# Patient Record
Sex: FEMALE | NOT HISPANIC OR LATINO
[De-identification: names, ages, dates, MRNs, and addresses within clinical notes are randomized per-mention and may not be internally consistent; named-entity substitution may affect disease eponyms.]

---

## 2017-09-27 ENCOUNTER — CHARTING TRANS (OUTPATIENT)
Dept: OTHER | Age: 47
End: 2017-09-27

## 2017-09-27 ENCOUNTER — LAB SERVICES (OUTPATIENT)
Dept: OTHER | Age: 47
End: 2017-09-27

## 2017-09-27 LAB
FLU A AG RAPID SCREEN: NEGATIVE
FLU B AG RAPID SCREEN: NEGATIVE
FLU RAPID SCREEN: NORMAL
SOURCE: NORMAL

## 2018-11-02 VITALS
HEIGHT: 67 IN | OXYGEN SATURATION: 96 % | BODY MASS INDEX: 29.82 KG/M2 | WEIGHT: 190 LBS | HEART RATE: 70 BPM | TEMPERATURE: 98.3 F | RESPIRATION RATE: 18 BRPM

## 2020-01-02 ENCOUNTER — WALK IN (OUTPATIENT)
Dept: URGENT CARE | Age: 50
End: 2020-01-02

## 2020-01-02 VITALS
HEART RATE: 60 BPM | WEIGHT: 205 LBS | TEMPERATURE: 98 F | DIASTOLIC BLOOD PRESSURE: 80 MMHG | SYSTOLIC BLOOD PRESSURE: 112 MMHG | OXYGEN SATURATION: 98 % | RESPIRATION RATE: 18 BRPM | BODY MASS INDEX: 32.18 KG/M2 | HEIGHT: 67 IN

## 2020-01-02 DIAGNOSIS — J01.00 ACUTE MAXILLARY SINUSITIS, RECURRENCE NOT SPECIFIED: Primary | ICD-10-CM

## 2020-01-02 LAB
INTERNAL PROCEDURAL CONTROLS ACCEPTABLE: YES
S PYO AG THROAT QL IA.RAPID: NEGATIVE

## 2020-01-02 PROCEDURE — 99214 OFFICE O/P EST MOD 30 MIN: CPT | Performed by: NURSE PRACTITIONER

## 2020-01-02 PROCEDURE — 87880 STREP A ASSAY W/OPTIC: CPT | Performed by: NURSE PRACTITIONER

## 2020-01-02 RX ORDER — FLUTICASONE PROPIONATE 50 MCG
1 SPRAY, SUSPENSION (ML) NASAL DAILY
Qty: 16 G | Refills: 0 | Status: SHIPPED | OUTPATIENT
Start: 2020-01-02

## 2020-01-02 RX ORDER — AMOXICILLIN AND CLAVULANATE POTASSIUM 875; 125 MG/1; MG/1
1 TABLET, FILM COATED ORAL 2 TIMES DAILY
Qty: 20 TABLET | Refills: 0 | Status: SHIPPED | OUTPATIENT
Start: 2020-01-02 | End: 2020-01-12

## 2020-01-02 RX ORDER — ESTRADIOL 0.1 MG/G
CREAM VAGINAL
Refills: 2 | COMMUNITY
Start: 2019-12-30

## 2020-01-02 SDOH — HEALTH STABILITY: MENTAL HEALTH: HOW OFTEN DO YOU HAVE A DRINK CONTAINING ALCOHOL?: 2-3 TIMES A WEEK

## 2020-01-02 ASSESSMENT — ENCOUNTER SYMPTOMS
STRIDOR: 0
FEVER: 0
COLOR CHANGE: 0
SHORTNESS OF BREATH: 0
EYE PAIN: 0
DIZZINESS: 0
EYE ITCHING: 0
HEADACHES: 0
SINUS PRESSURE: 1
CHILLS: 0
SINUS PAIN: 1
WHEEZING: 0
WEAKNESS: 0
TROUBLE SWALLOWING: 0
NAUSEA: 0
COUGH: 1
DIARRHEA: 0
FACIAL SWELLING: 0
SORE THROAT: 0
CHOKING: 0
RHINORRHEA: 1
VOMITING: 0
LIGHT-HEADEDNESS: 0
CHEST TIGHTNESS: 0
ABDOMINAL PAIN: 0
APNEA: 0
PHOTOPHOBIA: 0
APPETITE CHANGE: 0
EYE REDNESS: 0
EYE DISCHARGE: 0

## 2025-02-25 ENCOUNTER — APPOINTMENT (OUTPATIENT)
Dept: ULTRASOUND IMAGING | Age: 55
End: 2025-02-25
Attending: EMERGENCY MEDICINE
Payer: COMMERCIAL

## 2025-02-25 ENCOUNTER — HOSPITAL ENCOUNTER (EMERGENCY)
Age: 55
Discharge: HOME | End: 2025-02-25
Attending: EMERGENCY MEDICINE
Payer: COMMERCIAL

## 2025-02-25 ENCOUNTER — APPOINTMENT (OUTPATIENT)
Dept: CT IMAGING | Age: 55
End: 2025-02-25
Attending: EMERGENCY MEDICINE
Payer: COMMERCIAL

## 2025-02-25 VITALS
HEART RATE: 68 BPM | DIASTOLIC BLOOD PRESSURE: 78 MMHG | SYSTOLIC BLOOD PRESSURE: 135 MMHG | RESPIRATION RATE: 17 BRPM | BODY MASS INDEX: 32.96 KG/M2 | OXYGEN SATURATION: 100 % | TEMPERATURE: 98 F | WEIGHT: 210 LBS | HEIGHT: 67 IN

## 2025-02-25 DIAGNOSIS — R10.9 FLANK PAIN: Primary | ICD-10-CM

## 2025-02-25 DIAGNOSIS — N83.8 OVARIAN MASS: ICD-10-CM

## 2025-02-25 LAB
ALBUMIN SERPL-MCNC: 5.2 G/DL (ref 3.2–4.8)
ALBUMIN/GLOB SERPL: 2.5 {RATIO} (ref 1–2)
ALP LIVER SERPL-CCNC: 82 U/L
ALT SERPL-CCNC: 46 U/L
ANION GAP SERPL CALC-SCNC: 10 MMOL/L (ref 0–18)
AST SERPL-CCNC: 28 U/L (ref ?–34)
BASOPHILS # BLD AUTO: 0.14 X10(3) UL (ref 0–0.2)
BASOPHILS NFR BLD AUTO: 1.9 %
BILIRUB SERPL-MCNC: 0.5 MG/DL (ref 0.3–1.2)
BILIRUB UR QL STRIP.AUTO: NEGATIVE
BUN BLD-MCNC: 16 MG/DL (ref 9–23)
BUN BLD-MCNC: 18 MG/DL (ref 7–18)
CALCIUM BLD-MCNC: 10.1 MG/DL (ref 8.7–10.6)
CHLORIDE BLD-SCNC: 104 MMOL/L (ref 98–112)
CHLORIDE SERPL-SCNC: 101 MMOL/L (ref 98–112)
CLARITY UR REFRACT.AUTO: CLEAR
CO2 BLD-SCNC: 24 MMOL/L (ref 21–32)
CO2 SERPL-SCNC: 27 MMOL/L (ref 21–32)
COLOR UR AUTO: YELLOW
CREAT BLD-MCNC: 0.9 MG/DL
CREAT BLD-MCNC: 0.94 MG/DL
EGFRCR SERPLBLD CKD-EPI 2021: 72 ML/MIN/1.73M2 (ref 60–?)
EGFRCR SERPLBLD CKD-EPI 2021: 76 ML/MIN/1.73M2 (ref 60–?)
EOSINOPHIL # BLD AUTO: 0.37 X10(3) UL (ref 0–0.7)
EOSINOPHIL NFR BLD AUTO: 4.9 %
ERYTHROCYTE [DISTWIDTH] IN BLOOD BY AUTOMATED COUNT: 12.8 %
GLOBULIN PLAS-MCNC: 2.1 G/DL (ref 2–3.5)
GLUCOSE BLD-MCNC: 106 MG/DL (ref 70–99)
GLUCOSE BLD-MCNC: 106 MG/DL (ref 70–99)
GLUCOSE UR STRIP.AUTO-MCNC: NEGATIVE MG/DL
HCT VFR BLD AUTO: 42.8 %
HCT VFR BLD CALC: 44 %
HGB BLD-MCNC: 14.6 G/DL
IMM GRANULOCYTES # BLD AUTO: 0.02 X10(3) UL (ref 0–1)
IMM GRANULOCYTES NFR BLD: 0.3 %
ISTAT IONIZED CALCIUM FOR CHEM 8: 1.22 MMOL/L (ref 1.12–1.32)
KETONES UR STRIP.AUTO-MCNC: NEGATIVE MG/DL
LIPASE SERPL-CCNC: 31 U/L (ref 12–53)
LYMPHOCYTES # BLD AUTO: 2.7 X10(3) UL (ref 1–4)
LYMPHOCYTES NFR BLD AUTO: 35.7 %
MCH RBC QN AUTO: 31.2 PG (ref 26–34)
MCHC RBC AUTO-ENTMCNC: 34.1 G/DL (ref 31–37)
MCV RBC AUTO: 91.5 FL
MONOCYTES # BLD AUTO: 0.42 X10(3) UL (ref 0.1–1)
MONOCYTES NFR BLD AUTO: 5.6 %
NEUTROPHILS # BLD AUTO: 3.91 X10 (3) UL (ref 1.5–7.7)
NEUTROPHILS # BLD AUTO: 3.91 X10(3) UL (ref 1.5–7.7)
NEUTROPHILS NFR BLD AUTO: 51.6 %
NITRITE UR QL STRIP.AUTO: NEGATIVE
OSMOLALITY SERPL CALC.SUM OF ELEC: 288 MOSM/KG (ref 275–295)
PH UR STRIP.AUTO: 6 [PH] (ref 5–8)
PLATELET # BLD AUTO: 315 10(3)UL (ref 150–450)
POTASSIUM BLD-SCNC: 4 MMOL/L (ref 3.6–5.1)
POTASSIUM SERPL-SCNC: 4 MMOL/L (ref 3.5–5.1)
PROT SERPL-MCNC: 7.3 G/DL (ref 5.7–8.2)
PROT UR STRIP.AUTO-MCNC: NEGATIVE MG/DL
RBC # BLD AUTO: 4.68 X10(6)UL
RBC UR QL AUTO: NEGATIVE
SODIUM BLD-SCNC: 136 MMOL/L (ref 136–145)
SODIUM SERPL-SCNC: 138 MMOL/L (ref 136–145)
SP GR UR STRIP.AUTO: 1.01 (ref 1–1.03)
UROBILINOGEN UR STRIP.AUTO-MCNC: 0.2 MG/DL
WBC # BLD AUTO: 7.6 X10(3) UL (ref 4–11)

## 2025-02-25 PROCEDURE — 74177 CT ABD & PELVIS W/CONTRAST: CPT | Performed by: EMERGENCY MEDICINE

## 2025-02-25 PROCEDURE — 87086 URINE CULTURE/COLONY COUNT: CPT | Performed by: EMERGENCY MEDICINE

## 2025-02-25 PROCEDURE — 76856 US EXAM PELVIC COMPLETE: CPT | Performed by: EMERGENCY MEDICINE

## 2025-02-25 PROCEDURE — 85025 COMPLETE CBC W/AUTO DIFF WBC: CPT | Performed by: EMERGENCY MEDICINE

## 2025-02-25 PROCEDURE — 96374 THER/PROPH/DIAG INJ IV PUSH: CPT

## 2025-02-25 PROCEDURE — 93975 VASCULAR STUDY: CPT | Performed by: EMERGENCY MEDICINE

## 2025-02-25 PROCEDURE — 83690 ASSAY OF LIPASE: CPT | Performed by: EMERGENCY MEDICINE

## 2025-02-25 PROCEDURE — 80047 BASIC METABLC PNL IONIZED CA: CPT

## 2025-02-25 PROCEDURE — 76830 TRANSVAGINAL US NON-OB: CPT | Performed by: EMERGENCY MEDICINE

## 2025-02-25 PROCEDURE — 81001 URINALYSIS AUTO W/SCOPE: CPT | Performed by: EMERGENCY MEDICINE

## 2025-02-25 PROCEDURE — 80053 COMPREHEN METABOLIC PANEL: CPT | Performed by: EMERGENCY MEDICINE

## 2025-02-25 PROCEDURE — 99284 EMERGENCY DEPT VISIT MOD MDM: CPT

## 2025-02-25 PROCEDURE — 96361 HYDRATE IV INFUSION ADD-ON: CPT

## 2025-02-25 PROCEDURE — 99285 EMERGENCY DEPT VISIT HI MDM: CPT

## 2025-02-25 RX ORDER — DICLOFENAC SODIUM 75 MG/1
75 TABLET, DELAYED RELEASE ORAL 2 TIMES DAILY
Qty: 20 TABLET | Refills: 0 | Status: SHIPPED | OUTPATIENT
Start: 2025-02-25

## 2025-02-25 RX ORDER — KETOROLAC TROMETHAMINE 30 MG/ML
30 INJECTION, SOLUTION INTRAMUSCULAR; INTRAVENOUS ONCE
Status: COMPLETED | OUTPATIENT
Start: 2025-02-25 | End: 2025-02-25

## 2025-02-25 NOTE — ED PROVIDER NOTES
Patient Seen in: ward Emergency Department In [place]      History     Chief Complaint   Patient presents with    Abdomen/Flank Pain     Stated Complaint: right mid to lower back pain with radiation into right mid abdomen, worse with *    Subjective:   HPI      Patient is a 54-year-old female here with right flank pain radiating to right abdomen.  Started about 3 days ago.  Has positional component worse with movement and has been worse at night.  No fall trauma injury.  Denies any urinary symptoms or hematuria.  No nausea vomiting.  She took some ibuprofen which gives some marginal relief.  No fevers no chills.  No other associate symptoms.  No other complaints.    Objective:     Past Medical History:    Menopause    Obesity              Past Surgical History:   Procedure Laterality Date    Other surgical history      wisdom teeth    Tonsillectomy                  Social History     Socioeconomic History    Marital status:    Tobacco Use    Smoking status: Never    Smokeless tobacco: Never   Vaping Use    Vaping status: Never Used   Substance and Sexual Activity    Alcohol use: Yes     Alcohol/week: 2.0 standard drinks of alcohol     Types: 2 Glasses of wine per week    Drug use: No    Sexual activity: Yes     Partners: Male                  Physical Exam     ED Triage Vitals [02/25/25 0716]   BP (!) 176/93   Pulse 67   Resp 20   Temp 98 °F (36.7 °C)   Temp src Temporal   SpO2 100 %   O2 Device None (Room air)       Current Vitals:   Vital Signs  BP: (!) 176/93  Pulse: 67  Resp: 20  Temp: 98 °F (36.7 °C)  Temp src: Temporal    Oxygen Therapy  SpO2: 100 %  O2 Device: None (Room air)        Physical Exam  Vitals and nursing note reviewed.   Constitutional:       General: She is not in acute distress.     Appearance: She is well-developed. She is not toxic-appearing.   HENT:      Head: Normocephalic and atraumatic.   Eyes:      General: No scleral icterus.     Conjunctiva/sclera: Conjunctivae normal.    Cardiovascular:      Rate and Rhythm: Normal rate.   Pulmonary:      Effort: Pulmonary effort is normal. No respiratory distress.   Abdominal:      General: There is no distension.      Tenderness: There is abdominal tenderness. There is no guarding or rebound.   Musculoskeletal:         General: No tenderness. Normal range of motion.      Cervical back: Normal range of motion and neck supple.   Skin:     General: Skin is warm and dry.      Findings: No rash.   Neurological:      Mental Status: She is alert and oriented to person, place, and time.      Motor: No abnormal muscle tone.      Coordination: Coordination normal.   Psychiatric:         Behavior: Behavior normal.            ED Course     Labs Reviewed   COMP METABOLIC PANEL (14) - Abnormal; Notable for the following components:       Result Value    Glucose 106 (*)     Albumin 5.2 (*)     A/G Ratio 2.5 (*)     All other components within normal limits   URINALYSIS WITH CULTURE REFLEX - Abnormal; Notable for the following components:    Leukocyte Esterase Urine Trace (*)     All other components within normal limits   UA MICROSCOPIC ONLY, URINE - Abnormal; Notable for the following components:    Squamous Epi. Cells Few (*)     All other components within normal limits   POCT ISTAT CHEM8 CARTRIDGE - Abnormal; Notable for the following components:    ISTAT Glucose 106 (*)     All other components within normal limits   LIPASE - Normal   CBC WITH DIFFERENTIAL WITH PLATELET   URINE CULTURE, ROUTINE                    MDM                 -Comorbidities did add complexity to the management are mentioned in the HPI above        -I personally reviewed the prior external notes and the medical record to obtain additional history -[facility] for right axillary [facility]        -DDX: Includes but not limited to kidney stone diverticulitis appendicitis or ovarian which is a medical condition that can pose a threat to  life/function           -I personally reviewed the CT findings and it shows.  Left ovarian mass-  Please refer to radiology report for official interpretation       US PELVIS EV W DOPPLER (CPT=76856/69945/52831)   Final Result   PROCEDURE:  US PELVIS EV W DOPPLER (CPT=76856/89384/45135)       COMPARISON:  [facility], CT, CT ABDOMEN+PELVIS(CONTRAST ONLY)(CPT=74177),    2/25/2025, 9:28 AM.       INDICATIONS:  right mid to lower back pain with radiation into right mid    abdomen, worse with movement x 3 days       TECHNIQUE:  Ultrasound of the pelvis was performed with a transvaginal and    transabdominal probe.  Doppler evaluation of the ovaries was performed of    the ovarian arteries and veins.  B-mode images, Doppler color flow, and    spectral waveform analysis were    performed.  Transvaginal ultrasound was used to better evaluate adnexal    and endometrial detail.       PATIENT STATED HISTORY: (As transcribed by Technologist)  Patient with    right back pain.            MEASUREMENTS:             Uterus Length:                      11.56 cm x 3.69 cm x 2.57 cm           Endometrium Thickness:      3 mm   Right Ovary:                         2.90 cm x 1.90 cm x 1.66 cm       FINDINGS:       PELVIS     UTERUS:  Unremarkable appearance.    RIGHT OVARY:  The right ovary appears normal in size, shape, and    echogenicity.  No significant masses are identified.   LEFT OVARY:  The left ovary is not reliably seen and could be obscured by    bowel gas, however there is a mass in the left adnexa with internal blood    flow and mixed echogenicity measuring 4.1 x 2.6 x 3.8 cm.  This could    represent a solid ovarian neoplasm,    pedunculated fibroid, with other etiologies not entirely excluded.     Clinical correlation recommended.  MRI of pelvis with and without contrast    may be helpful for further characterization as clinically directed.    CUL-DE-SAC:  Negative.       DOPPLER WAVE FORMS   FLOW:  There is normal  arterial and venous Doppler wave forms in the right    ovary.  Blood flow also demonstrated within the left adnexal mass.  The    spectral analysis is within normal limits.   OTHER:  Negative.                         =====   CONCLUSION:         1. The left ovary is not reliably seen and could be obscured by bowel gas,    however there is a mass in the left adnexa with internal blood flow and    mixed echogenicity measuring 4.1 x 2.6 x 3.8 cm.  This could represent a    solid ovarian neoplasm,    pedunculated fibroid, with other etiologies not entirely excluded.     Clinical correlation recommended.  MRI of pelvis with and without contrast    may be helpful for further characterization as clinically directed.       2. Unremarkable uterus and right ovary.  No evidence of right ovarian    torsion.         Please see above for further details.       LOCATION:  [facility]                   Dictated by (CST): Rafael Mueller MD on 2/25/2025 at 10:51 AM        Finalized by (CST): Rafael Mueller MD on 2/25/2025 at 10:54 AM         CT ABDOMEN+PELVIS(CONTRAST ONLY)(CPT=74177)   Final Result   PROCEDURE:  CT ABDOMEN+PELVIS (CONTRAST ONLY) (CPT=74177)       COMPARISON:  None.       INDICATIONS:  right mid to lower back pain with radiation into right mid    abdomen, worse with movement x 3 days       TECHNIQUE:  CT scanning was performed from the dome of the diaphragm to    the pubic symphysis with non-ionic intravenous contrast material. Post    contrast coronal MPR imaging was performed.  Dose reduction techniques    were used. Dose information is    transmitted to the ACR (American College of Radiology) NRDR (National    Radiology Data Registry) which includes the Dose Index Registry.       PATIENT STATED HISTORY:(As transcribed by Technologist)  RLQ and right    flank pain for 3 days.         CONTRAST USED:  100cc of Isovue 370       FINDINGS:   LUNG BASE:  Minimal scattered atelectasis/scarring.   LIVER:  Diffuse fatty  infiltration of the liver with areas of focal fatty    sparing along the gallbladder fossa.   BILIARY:  Unremarkable.   SPLEEN:  Unremarkable.   PANCREAS:  Unremarkable.   ADRENALS:  Unremarkable.   KIDNEYS:  Unremarkable.   AORTA/VASCULAR:  Unremarkable.   RETROPERITONEUM:  Probable reactive retroperitoneal lymph nodes measuring    under 1 cm short axis.   BOWEL/MESENTERY:  Unremarkable appendix.  There is mild colonic wall    thickening that is likely related to incomplete distension, with    nonspecific colitis not excluded.  Clinical correlation recommended.  No    large or small bowel dilatation.  No free air    or free fluid.   ABDOMINAL WALL:  Unremarkable.   PELVIC ORGANS:  There is a mixed attenuation left adnexal mass measuring    up to 4.8 x 3.0 cm that may be ovarian in origin and may represent a    hemorrhagic functional cyst, with ovarian torsion, solid ovarian neoplasm,    exophytic uterine fibroid, or other    etiologies not entirely excluded.  Clinical correlation recommended.     Dedicated pelvic ultrasound with Doppler exam is suggested for further    evaluation.  Unremarkable urinary bladder.  Unremarkable right ovary.   LYMPH NODES:  No lymphadenopathy in the abdomen or pelvis.   BONES:  Degenerative changes in the spine and hips.    OTHER:  None.                             =====   CONCLUSION:         1. There is a mixed attenuation left adnexal mass measuring up to 4.8 x    3.0 cm that may be ovarian in origin and may represent a hemorrhagic    functional cyst, with ovarian torsion, solid ovarian neoplasm, exophytic    uterine fibroid, or other etiologies not    entirely excluded.  Clinical correlation recommended.  Dedicated pelvic    ultrasound with Doppler exam is suggested for further evaluation.         2. There is mild colonic wall thickening that is likely related to    incomplete distension, with nonspecific colitis not excluded.  Clinical    correlation recommended.        3. Diffuse  fatty infiltration of the liver.         Please see above for further details.       LOCATION:  [facility]           Dictated by (CST): Rafael Mueller MD on 2/25/2025 at 9:43 AM        Finalized by (CST): Rafael Mueller MD on 2/25/2025 at 9:47 AM             Labs Reviewed   COMP METABOLIC PANEL (14) - Abnormal; Notable for the following components:       Result Value    Glucose 106 (*)     Albumin 5.2 (*)     A/G Ratio 2.5 (*)     All other components within normal limits   URINALYSIS WITH CULTURE REFLEX - Abnormal; Notable for the following components:    Leukocyte Esterase Urine Trace (*)     All other components within normal limits   UA MICROSCOPIC ONLY, URINE - Abnormal; Notable for the following components:    Squamous Epi. Cells Few (*)     All other components within normal limits   POCT ISTAT CHEM8 CARTRIDGE - Abnormal; Notable for the following components:    ISTAT Glucose 106 (*)     All other components within normal limits   LIPASE - Normal   CBC WITH DIFFERENTIAL WITH PLATELET   URINE CULTURE, ROUTINE     Reviewed ultrasound findings with OB/GYN Dr. Sanz.  Unlikely ovarian torsion as the patient has no pain on the left side.  Will have him follow-up as an outpatient for further workup of this ovarian mass.  Patient was informed of above findings.  Vital signs stable she is no acute distress.  Patient understands importance of outpatient follow-up to rule out malignancy or other etiologies.  She was discharged home stable condition.        Medical Decision Making      Disposition and Plan     Clinical Impression:  1. Flank pain    2. Ovarian mass         Disposition:  Discharge  2/25/2025 11:23 am    Follow-up:  Asmita Murray MD  [facility] [place] [address]  [phone]    Follow up in 2 day(s)            Medications Prescribed:  Current Discharge Medication List        START taking these medications    Details   diclofenac 75 MG Oral Tab EC Take 1 tablet (75 mg total) by mouth  2 (two) times daily.  Qty: 20 tablet, Refills: 0                 Supplementary Documentation:

## 2025-02-25 NOTE — DISCHARGE INSTRUCTIONS
You have an ovarian mass on your left ovary.  Follow-up with your OB/GYN for further workup regarding this to rule out cancer or other etiologies.

## 2025-02-25 NOTE — ED INITIAL ASSESSMENT (HPI)
Pt to ed with c/o right lower back/flank ain with radiation into the right lower abdominal pain, pain is described as aching, cramping and burning. Pain increases with movement and is worse at night. Denies urinary symptoms.